# Patient Record
Sex: FEMALE | Race: WHITE | NOT HISPANIC OR LATINO | URBAN - METROPOLITAN AREA
[De-identification: names, ages, dates, MRNs, and addresses within clinical notes are randomized per-mention and may not be internally consistent; named-entity substitution may affect disease eponyms.]

---

## 2022-01-24 ENCOUNTER — NEW PATIENT (OUTPATIENT)
Dept: URBAN - METROPOLITAN AREA CLINIC 20 | Facility: CLINIC | Age: 72
End: 2022-01-24

## 2022-01-24 DIAGNOSIS — H25.13: ICD-10-CM

## 2022-01-24 PROCEDURE — 92014 COMPRE OPH EXAM EST PT 1/>: CPT

## 2022-01-24 ASSESSMENT — KERATOMETRY
OD_K2POWER_DIOPTERS: 44.75
OD_K2POWER_DIOPTERS: 44.50
OS_AXISANGLE_DEGREES: 49
OS_K1POWER_DIOPTERS: 44.00
OD_AXISANGLE_DEGREES: 050
OD_AXISANGLE2_DEGREES: 56
OS_AXISANGLE_DEGREES: 047
OS_AXISANGLE2_DEGREES: 138
OS_AXISANGLE_DEGREES: 048
OS_K2POWER_DIOPTERS: 44.50
OD_AXISANGLE2_DEGREES: 53
OS_K2POWER_DIOPTERS: 44.75
OD_AXISANGLE_DEGREES: 146
OD_K2POWER_DIOPTERS: 44.25
OS_AXISANGLE2_DEGREES: 137
OS_AXISANGLE2_DEGREES: 139
OD_K1POWER_DIOPTERS: 44.00
OD_AXISANGLE2_DEGREES: 140
OD_AXISANGLE_DEGREES: 143

## 2022-01-24 ASSESSMENT — VISUAL ACUITY
OS_SC: 20/50
OU_SC: 20/30-2
OU_SC: J5
OD_SC: 20/40-1

## 2022-01-24 ASSESSMENT — TONOMETRY
OD_IOP_MMHG: 15
OS_IOP_MMHG: 14

## 2022-01-24 NOTE — PATIENT DISCUSSION
Risks, benefits, limitations, and alternatives of cataract extraction discussed with patient, including but not limited to: bleeding, infection, acute or chronic intraocular inflammation, retinal hole/tear/detachment, increased or decreased intraocular pressure, macular edema, corneal edema, posterior capsule opacification, ptosis, irregular pupil, no improvement in vision, worsened vision, need for additional surgery, and death. Patient understands the risks and wishes to proceed.

## 2022-01-24 NOTE — PATIENT DISCUSSION
The patient elects Advanced vision (Synergy) in the right eye to obtain distance, intermediate and near vision and Custom vision (Toric distance likely Eyehance with minimizing astigmatism) in the left eye to obtain distance vision.

## 2022-02-21 ENCOUNTER — PRE-OP/H&P (OUTPATIENT)
Dept: URBAN - METROPOLITAN AREA CLINIC 20 | Facility: CLINIC | Age: 72
End: 2022-02-21

## 2022-02-21 VITALS
WEIGHT: 118 LBS | DIASTOLIC BLOOD PRESSURE: 86 MMHG | HEART RATE: 60 BPM | SYSTOLIC BLOOD PRESSURE: 129 MMHG | BODY MASS INDEX: 23.16 KG/M2 | HEIGHT: 60 IN

## 2022-02-21 DIAGNOSIS — H52.223: ICD-10-CM

## 2022-02-21 DIAGNOSIS — H35.363: ICD-10-CM

## 2022-02-21 DIAGNOSIS — H25.813: ICD-10-CM

## 2022-02-21 DIAGNOSIS — H16.223: ICD-10-CM

## 2022-02-21 PROCEDURE — 99211PRE PRE OP VISIT

## 2022-02-21 PROCEDURE — 92136 OPHTHALMIC BIOMETRY: CPT

## 2022-02-21 ASSESSMENT — KERATOMETRY
OD_AXISANGLE_DEGREES: 143
OD_K2POWER_DIOPTERS: 44.25
OS_K2POWER_DIOPTERS: 44.50
OD_AXISANGLE_DEGREES: 050
OD_AXISANGLE2_DEGREES: 53
OD_K2POWER_DIOPTERS: 44.75
OD_AXISANGLE2_DEGREES: 56
OS_AXISANGLE_DEGREES: 49
OD_AXISANGLE2_DEGREES: 140
OS_AXISANGLE_DEGREES: 047
OS_AXISANGLE2_DEGREES: 137
OS_AXISANGLE_DEGREES: 048
OD_K2POWER_DIOPTERS: 44.50
OS_AXISANGLE2_DEGREES: 138
OS_K1POWER_DIOPTERS: 44.00
OD_AXISANGLE_DEGREES: 146
OS_AXISANGLE2_DEGREES: 139
OD_K1POWER_DIOPTERS: 44.00
OS_K2POWER_DIOPTERS: 44.75

## 2022-03-24 ENCOUNTER — POST-OP (OUTPATIENT)
Dept: URBAN - METROPOLITAN AREA CLINIC 20 | Facility: CLINIC | Age: 72
End: 2022-03-24

## 2022-03-24 DIAGNOSIS — H16.223: ICD-10-CM

## 2022-03-24 DIAGNOSIS — H35.363: ICD-10-CM

## 2022-03-24 DIAGNOSIS — Z96.1: ICD-10-CM

## 2022-03-24 DIAGNOSIS — H52.223: ICD-10-CM

## 2022-03-24 PROCEDURE — 99024 POSTOP FOLLOW-UP VISIT: CPT

## 2022-03-24 ASSESSMENT — KERATOMETRY
OS_K2POWER_DIOPTERS: 44.50
OD_K1POWER_DIOPTERS: 44.00
OS_AXISANGLE_DEGREES: 047
OD_AXISANGLE2_DEGREES: 53
OS_AXISANGLE2_DEGREES: 139
OS_K2POWER_DIOPTERS: 44.75
OS_AXISANGLE2_DEGREES: 137
OD_AXISANGLE2_DEGREES: 140
OS_AXISANGLE2_DEGREES: 138
OD_AXISANGLE2_DEGREES: 56
OD_K2POWER_DIOPTERS: 44.75
OS_AXISANGLE_DEGREES: 49
OS_AXISANGLE_DEGREES: 048
OD_K2POWER_DIOPTERS: 44.25
OD_AXISANGLE_DEGREES: 143
OD_AXISANGLE_DEGREES: 050
OS_K1POWER_DIOPTERS: 44.00
OD_K2POWER_DIOPTERS: 44.50
OD_AXISANGLE_DEGREES: 146

## 2022-03-24 ASSESSMENT — VISUAL ACUITY
OD_SC: 20/100
OD_PH: 20/80

## 2022-03-24 ASSESSMENT — TONOMETRY: OD_IOP_MMHG: 14

## 2022-03-31 ENCOUNTER — POST-OP (OUTPATIENT)
Dept: URBAN - METROPOLITAN AREA CLINIC 20 | Facility: CLINIC | Age: 72
End: 2022-03-31

## 2022-03-31 DIAGNOSIS — Z96.1: ICD-10-CM

## 2022-03-31 DIAGNOSIS — H16.223: ICD-10-CM

## 2022-03-31 DIAGNOSIS — H35.363: ICD-10-CM

## 2022-03-31 DIAGNOSIS — H52.223: ICD-10-CM

## 2022-03-31 PROCEDURE — 99024 POSTOP FOLLOW-UP VISIT: CPT

## 2022-03-31 ASSESSMENT — KERATOMETRY
OS_AXISANGLE_DEGREES: 49
OD_AXISANGLE2_DEGREES: 140
OS_AXISANGLE2_DEGREES: 137
OD_K1POWER_DIOPTERS: 44.00
OD_K2POWER_DIOPTERS: 44.75
OD_K2POWER_DIOPTERS: 44.25
OD_AXISANGLE2_DEGREES: 56
OS_AXISANGLE2_DEGREES: 139
OD_AXISANGLE_DEGREES: 146
OD_AXISANGLE_DEGREES: 050
OD_K2POWER_DIOPTERS: 44.50
OD_AXISANGLE_DEGREES: 143
OS_AXISANGLE_DEGREES: 047
OD_AXISANGLE2_DEGREES: 53
OS_K2POWER_DIOPTERS: 44.75
OS_AXISANGLE_DEGREES: 048
OS_K2POWER_DIOPTERS: 44.50
OS_AXISANGLE2_DEGREES: 138
OS_K1POWER_DIOPTERS: 44.00

## 2022-03-31 ASSESSMENT — VISUAL ACUITY: OD_SC: 20/40

## 2022-03-31 ASSESSMENT — TONOMETRY
OS_IOP_MMHG: 21
OD_IOP_MMHG: 16

## 2022-03-31 NOTE — PATIENT DISCUSSION
Surgery 2nd Eye Counseling: The patient has noticed an improvement in their visual symptoms in the operative eye. The patient complains of decreased vision in the fellow eye . It was explained to the patient that the decision to proceed with cataract surgery in the fellow eye is entirely a separate decision from the surgical eye. All the same risks, benefits and alternatives were reviewed with the patient again. The patient does feel the vision in the non-operative eye is limiting their daily activities and elects to proceed with surgery in the cataract eye.

## 2022-04-14 ENCOUNTER — POST-OP (OUTPATIENT)
Dept: URBAN - METROPOLITAN AREA CLINIC 20 | Facility: CLINIC | Age: 72
End: 2022-04-14

## 2022-04-14 DIAGNOSIS — Z96.1: ICD-10-CM

## 2022-04-14 PROCEDURE — 99024 POSTOP FOLLOW-UP VISIT: CPT

## 2022-04-14 ASSESSMENT — TONOMETRY: OS_IOP_MMHG: 18

## 2022-04-14 ASSESSMENT — VISUAL ACUITY
OD_SC: 20/30-2
OS_SC: 20/40+1
OU_SC: 20/30

## 2022-04-28 ENCOUNTER — POST-OP (OUTPATIENT)
Dept: URBAN - METROPOLITAN AREA CLINIC 20 | Facility: CLINIC | Age: 72
End: 2022-04-28

## 2022-04-28 DIAGNOSIS — Z96.1: ICD-10-CM

## 2022-04-28 PROCEDURE — 99024 POSTOP FOLLOW-UP VISIT: CPT

## 2022-04-28 ASSESSMENT — TONOMETRY
OS_IOP_MMHG: 18
OD_IOP_MMHG: 14

## 2022-04-28 ASSESSMENT — VISUAL ACUITY
OU_SC: J2
OD_SC: 20/50+2
OS_SC: 20/20-1
OU_SC: 20/25+1

## 2022-05-23 ENCOUNTER — ESTABLISHED PATIENT (OUTPATIENT)
Dept: URBAN - METROPOLITAN AREA CLINIC 20 | Facility: CLINIC | Age: 72
End: 2022-05-23

## 2022-05-23 DIAGNOSIS — H35.363: ICD-10-CM

## 2022-05-23 DIAGNOSIS — H16.223: ICD-10-CM

## 2022-05-23 DIAGNOSIS — Z96.1: ICD-10-CM

## 2022-05-23 DIAGNOSIS — H52.223: ICD-10-CM

## 2022-05-23 PROCEDURE — 99024 POSTOP FOLLOW-UP VISIT: CPT

## 2022-05-23 ASSESSMENT — VISUAL ACUITY
OU_SC: 20/20
OU_SC: J1+
OS_SC: 20/25
OD_SC: 20/40+2

## 2022-05-23 ASSESSMENT — TONOMETRY
OS_IOP_MMHG: 14
OD_IOP_MMHG: 12

## 2022-08-01 ENCOUNTER — ESTABLISHED PATIENT (OUTPATIENT)
Dept: URBAN - METROPOLITAN AREA CLINIC 20 | Facility: CLINIC | Age: 72
End: 2022-08-01

## 2022-08-01 DIAGNOSIS — H26.493: ICD-10-CM

## 2022-08-01 PROCEDURE — 99214 OFFICE O/P EST MOD 30 MIN: CPT

## 2022-08-01 PROCEDURE — 66821 AFTER CATARACT LASER SURGERY: CPT

## 2022-08-01 RX ORDER — PREDNISOLONE ACETATE 10 MG/ML: 1 SUSPENSION/ DROPS OPHTHALMIC

## 2022-08-01 ASSESSMENT — VISUAL ACUITY
OS_SC: 20/25
OD_SC: 20/30-2

## 2022-08-01 ASSESSMENT — TONOMETRY
OD_IOP_MMHG: 17
OS_IOP_MMHG: 11

## 2022-08-01 NOTE — PROCEDURE NOTE: CLINICAL
PROCEDURE NOTE: YAG Capsulotomy OD. Diagnosis: Posterior Capsular Opacity. Anesthesia: Topical. Prior to commencing surgery patient identification, surgical procedure, site, and side were confirmed by Dr. Sherwin Moraes. Following topical proparacaine anesthesia, the patient was positioned at the YAG laser, a contact lens coupled to the cornea with methylcellulose and an axial posterior performed capsulotomy without complication . Excess methylcellulose was washed from the eye. One drop of Alphagan was instilled and the patient returned to the holding area having tolerated the procedure well and without complication. Saurabh Harkins

## 2022-08-18 ENCOUNTER — POST-OP (OUTPATIENT)
Dept: URBAN - METROPOLITAN AREA CLINIC 20 | Facility: CLINIC | Age: 72
End: 2022-08-18

## 2022-08-18 DIAGNOSIS — Z98.890: ICD-10-CM

## 2022-08-18 DIAGNOSIS — H26.492: ICD-10-CM

## 2022-08-18 PROCEDURE — 99024 POSTOP FOLLOW-UP VISIT: CPT

## 2022-08-18 PROCEDURE — 66821 AFTER CATARACT LASER SURGERY: CPT

## 2022-08-18 ASSESSMENT — TONOMETRY
OS_IOP_MMHG: 15
OD_IOP_MMHG: 14

## 2022-08-18 ASSESSMENT — VISUAL ACUITY
OS_SC: 20/30
OD_SC: 20/40+1

## 2022-08-18 NOTE — PROCEDURE NOTE: CLINICAL
PROCEDURE NOTE: YAG Capsulotomy OS. Diagnosis: Posterior Capsular Opacity. Anesthesia: Topical. Prior to commencing surgery patient identification, surgical procedure, site, and side were confirmed by Dr. Joe Purdy. Following topical proparacaine anesthesia, the patient was positioned at the YAG laser, a contact lens coupled to the cornea with methylcellulose and an axial posterior performed capsulotomy without complication . Excess methylcellulose was washed from the eye. One drop of Alphagan was instilled and the patient returned to the holding area having tolerated the procedure well and without complication. Checo Lozano

## 2022-09-15 ENCOUNTER — POST-OP (OUTPATIENT)
Dept: URBAN - METROPOLITAN AREA CLINIC 20 | Facility: CLINIC | Age: 72
End: 2022-09-15

## 2022-09-15 DIAGNOSIS — H35.363: ICD-10-CM

## 2022-09-15 DIAGNOSIS — Z98.890: ICD-10-CM

## 2022-09-15 PROCEDURE — 99024 POSTOP FOLLOW-UP VISIT: CPT

## 2022-09-15 RX ORDER — CYCLOSPORINE 0 MG/ML: 1 SOLUTION/ DROPS OPHTHALMIC; TOPICAL TWICE A DAY

## 2022-09-15 ASSESSMENT — VISUAL ACUITY
OS_SC: 20/25
OD_SC: 20/30+2
OU_SC: 20/25

## 2022-09-15 ASSESSMENT — TONOMETRY
OD_IOP_MMHG: 15
OS_IOP_MMHG: 17

## 2023-11-13 ENCOUNTER — COMPREHENSIVE EXAM (OUTPATIENT)
Dept: URBAN - METROPOLITAN AREA CLINIC 20 | Facility: CLINIC | Age: 73
End: 2023-11-13

## 2023-11-13 DIAGNOSIS — H16.223: ICD-10-CM

## 2023-11-13 PROCEDURE — 99214 OFFICE O/P EST MOD 30 MIN: CPT

## 2023-11-13 ASSESSMENT — VISUAL ACUITY
OD_SC: 20/40-1
OS_SC: 20/30-2

## 2023-11-13 ASSESSMENT — TONOMETRY
OD_IOP_MMHG: 14
OS_IOP_MMHG: 16